# Patient Record
Sex: FEMALE | URBAN - METROPOLITAN AREA
[De-identification: names, ages, dates, MRNs, and addresses within clinical notes are randomized per-mention and may not be internally consistent; named-entity substitution may affect disease eponyms.]

---

## 2022-07-25 VITALS
HEIGHT: 63 IN | SYSTOLIC BLOOD PRESSURE: 104 MMHG | WEIGHT: 152.2 LBS | BODY MASS INDEX: 26.97 KG/M2 | DIASTOLIC BLOOD PRESSURE: 86 MMHG | HEART RATE: 94 BPM | OXYGEN SATURATION: 99 %

## 2022-07-25 PROBLEM — R87.810 ATYPICAL SQUAMOUS CELL CHANGES OF UNDETERMINED SIGNIFICANCE (ASCUS) ON CERVICAL CYTOLOGY WITH POSITIVE HIGH RISK HUMAN PAPILLOMA VIRUS (HPV): Status: ACTIVE | Noted: 2019-05-22

## 2022-07-25 PROBLEM — R74.8 ALKALINE PHOSPHATASE RAISED: Status: ACTIVE | Noted: 2022-07-25

## 2022-07-25 PROBLEM — R87.610 ATYPICAL SQUAMOUS CELL CHANGES OF UNDETERMINED SIGNIFICANCE (ASCUS) ON CERVICAL CYTOLOGY WITH POSITIVE HIGH RISK HUMAN PAPILLOMA VIRUS (HPV): Status: ACTIVE | Noted: 2019-05-22

## 2022-07-25 PROBLEM — J34.2 DEVIATED NASAL SEPTUM: Status: ACTIVE | Noted: 2022-07-25

## 2022-07-25 RX ORDER — CETIRIZINE HYDROCHLORIDE 10 MG/1
10 TABLET ORAL DAILY
COMMUNITY

## 2022-07-25 RX ORDER — VENLAFAXINE HYDROCHLORIDE 37.5 MG/1
37.5 CAPSULE, EXTENDED RELEASE ORAL DAILY
COMMUNITY
End: 2022-07-26

## 2022-07-25 RX ORDER — HYDROCORTISONE 25 MG/G
CREAM TOPICAL
COMMUNITY
End: 2022-07-26

## 2022-07-25 RX ORDER — LEVOTHYROXINE SODIUM 0.03 MG/1
25 TABLET ORAL DAILY
COMMUNITY

## 2022-07-25 NOTE — PROGRESS NOTES
Patient ID: Jay Murrieta is a 61 y.o. female    Chief Complaint: Hypothyroidism due to Hashimoto's disease   Referred by Mirna Martinez, [place]     HPI:   Jay Murrieta is here for initial evaluation of Hypothyroidism due to Hashimoto's disease. Levothyroxine started when TSH 5.63 in May 2022. US thyroid - May 2022  Right superior ill defined hypoechoic nodule 2.2 x 2.2 x 1.1 cms   Right posteriorly located hypoechoic nodule of 1 x 0.8 x 0.6 cms   Left side has three subcentimeter nodules   Two small lymph nodes seen, 8mm and 6mm     These nodules are stable from Jan 2021. She was sent to [address] Radiologist mentioned in his report \" The right lobe demonstrates an ill-defined area which is hypoechoic inhomogeneous echotexture, without well defined margins. .  Background thyroid gland demonstrates diffuse inhomogeneous echotexture. There is not a well defined nodule corresponding to this area for biopsy. There is a small second nodule measuring 8 mm which wasdiscussed at the time of the ProScan exam. \"     Takes Levothyroxine 25 mcg daily in morning empty stomach with water and waits for 60 minutes. Energy levels are slightly better   No change sin hair or skin changes   Weight is stable   She gets hot easily. Finger tips and feet get cold. She has Raynaud's. Sleep is poor due to hot flashes   Mood is stable   Denies excessive sweating, tremors, palpitations   No neck pain, denies compressive neck symptoms   Menopause at age 45     Family history of thyroid disorder: Dad had thyroidectomy for thyroid cancer. Sister taking levothyroxine. Also had biopsy which was benign   (not a relative) is a thyroid cancer survivor     No neck radiation  No Recent iodine loading in form of contrast material for diagnostic studies/cardiac cath  MVT daily, Vit D3 2000 units daily.  No other Food supplements, herbs or medications including Biotin  No recent URTI    Does not smoke       The following portions of the patient's history were reviewed and updated as appropriate:       Family History   Problem Relation Age of Onset    Other Mother     Stroke Father     Thyroid Disease Sister     Heart Disease Maternal Grandfather     Heart Disease Paternal Grandmother     Heart Failure Paternal Grandfather             Social History     Socioeconomic History    Marital status: Not on file     Spouse name: Not on file    Number of children: Not on file    Years of education: Not on file    Highest education level: Not on file   Occupational History    Not on file   Tobacco Use    Smoking status: Never    Smokeless tobacco: Never   Vaping Use    Vaping Use: Never used   Substance and Sexual Activity    Alcohol use: Yes     Comment: 2-4 times per month will have 1-2 drinks    Drug use: Never    Sexual activity: Not on file   Other Topics Concern    Not on file   Social History Narrative    Not on file     Social Determinants of Health     Financial Resource Strain: Not on file   Food Insecurity: Not on file   Transportation Needs: Not on file   Physical Activity: Not on file   Stress: Not on file   Social Connections: Not on file   Intimate Partner Violence: Not on file   Housing Stability: Not on file           Past Medical History:   Diagnosis Date    Alkaline phosphatase raised     Deviated nasal septum     Hashimoto's thyroiditis     Hypothyroidism     Nontoxic single thyroid nodule          History reviewed. No pertinent surgical history. No Known Allergies        Current Outpatient Medications:     levothyroxine (SYNTHROID) 25 MCG tablet, Take 25 mcg by mouth in the morning., Disp: , Rfl:     cetirizine (ZYRTEC) 10 MG tablet, Take 10 mg by mouth in the morning., Disp: , Rfl:       Review of Systems:  Constitutional: Negative for fever, chills. HENT: Negative for congestion, ear pain, rhinorrhea,  sore throat and trouble swallowing. Eyes: Negative for photophobia, redness, itching.    Respiratory: Negative for cough, shortness of breath and sputum. Cardiovascular: Negative for chest pain and leg swelling. Gastrointestinal: Negative for nausea, vomiting, abdominal pain, diarrhea, constipation. Endocrine: Negative for polydipsia, polyphagia and polyuria. Genitourinary: Negative for dysuria, urgency, frequency, hematuria and flank pain. Musculoskeletal: Negative for myalgias, back pain, arthralgias. Skin/Nail: Negative for rash, itching. Normal nails. Neurological: Negative for seizures, light-headedness, numbness and headaches. Hematological/ Lymph nodes: Negative for adenopathy. Does not bruise/bleed easily. Psychiatric/Behavioral: Negative for suicidal ideas and decreased concentration. Physical Exam:  /78 (Site: Left Upper Arm, Position: Sitting, Cuff Size: Medium Adult)   Pulse 72   Ht 5' 3\" (1.6 m)   Wt 152 lb 12.8 oz (69.3 kg)   SpO2 98%   BMI 27.07 kg/m²   Constitutional: Patient is oriented to person, place, and time. Patient appears well-developed and well-nourished. HENT:    Head: Normocephalic and atraumatic. Eyes: Conjunctivae and EOM are normal.     Neck: Normal range of motion. Thyroid normal.   Cardiovascular: Normal rate, regular rhythm and normal heart sounds. Pulmonary/Chest: Effort normal and breath sounds normal.   Musculoskeletal: Normal range of motion. Neurological: Patient is alert and oriented to person, place, and time. Patient has normal reflexes. No hand tremors. Skin: Skin is warm and dry. Psychiatric: Patient has a normal mood and affect. Patient behavior is normal.     Lab Review:    No results found for any previous visit. No results found. Assessment/Plan:     Adriana Vera was seen today for new patient. Diagnoses and all orders for this visit:    Acquired hypothyroidism  -     TSH with Reflex to FT4; Future  -     Comprehensive Metabolic Panel; Future  -     Vitamin D 25 Hydroxy; Future  -     PTH, Intact;  Future  -     Vitamin D 25 Hydroxy; Future  -     Vitamin D 1,25 Dihydroxy; Future  -     Electrophoresis Protein, Serum; Future  -     Protein Electrophoresis, Urine; Future  -     Magnesium; Future  -     Alkaline Phosphatase, Isoenzymes; Future    Multiple thyroid nodules  -     TSH with Reflex to FT4; Future  -     Comprehensive Metabolic Panel; Future  -     Vitamin D 25 Hydroxy; Future  -     PTH, Intact; Future  -     Vitamin D 25 Hydroxy; Future  -     Vitamin D 1,25 Dihydroxy; Future  -     Electrophoresis Protein, Serum; Future  -     Protein Electrophoresis, Urine; Future  -     Magnesium; Future  -     Alkaline Phosphatase, Isoenzymes; Future    Elevated alkaline phosphatase level  -     TSH with Reflex to FT4; Future  -     Comprehensive Metabolic Panel; Future  -     Vitamin D 25 Hydroxy; Future  -     PTH, Intact; Future  -     Vitamin D 25 Hydroxy; Future  -     Vitamin D 1,25 Dihydroxy; Future  -     Electrophoresis Protein, Serum; Future  -     Protein Electrophoresis, Urine; Future  -     Magnesium; Future  -     Alkaline Phosphatase, Isoenzymes; Future    Vitamin D deficiency  -     TSH with Reflex to FT4; Future  -     Comprehensive Metabolic Panel; Future  -     Vitamin D 25 Hydroxy; Future  -     PTH, Intact; Future  -     Vitamin D 25 Hydroxy; Future  -     Vitamin D 1,25 Dihydroxy; Future  -     Electrophoresis Protein, Serum; Future  -     Protein Electrophoresis, Urine; Future  -     Magnesium; Future  -     Alkaline Phosphatase, Isoenzymes; Future      1: Hypothyroidism     TFT normal June 2022     C/w Levothyroxine 25 mcg daily in am     Education: Reviewed how to properly take thyroid replacement. Instructed to take daily with water on an empty stomach without concomitant vitamins or calcium or other medicine. Wait for 30-45 minutes before eating. If patient is confident they missed a day of pills, they can take the missed dose with their next tablet (only for levothyroxine).     2: Multiple thyroid nodules Family history of thyroid cancer     Bedside US done   Gland is heterogeneous   Right superior 2.2 x 1.0 cms hypoechoic nodule seen     Fna indicated   Other option is active surveillance     She said she will think about it and will let us know   If she decides against Fna then repeat US in May 2023     2: elevated alkaline phosphatase   Range 122-130 - mildly high   Vit D 39.8 - April 2022      Check PTH, Mag, phos, SPEP, UPEP, Vit D, alk phos isoenzymes     RTC in 4 months with labs       Electronically signed by Gerardo Moody MD on 7/26/2022 at 2:42 PM

## 2022-07-26 ENCOUNTER — OFFICE VISIT (OUTPATIENT)
Dept: ENDOCRINOLOGY | Age: 57
End: 2022-07-26
Payer: COMMERCIAL

## 2022-07-26 VITALS
SYSTOLIC BLOOD PRESSURE: 122 MMHG | DIASTOLIC BLOOD PRESSURE: 78 MMHG | HEART RATE: 72 BPM | OXYGEN SATURATION: 98 % | HEIGHT: 63 IN | WEIGHT: 152.8 LBS | BODY MASS INDEX: 27.07 KG/M2

## 2022-07-26 DIAGNOSIS — E55.9 VITAMIN D DEFICIENCY: ICD-10-CM

## 2022-07-26 DIAGNOSIS — E03.9 ACQUIRED HYPOTHYROIDISM: Primary | ICD-10-CM

## 2022-07-26 DIAGNOSIS — R74.8 ELEVATED ALKALINE PHOSPHATASE LEVEL: ICD-10-CM

## 2022-07-26 DIAGNOSIS — E04.2 MULTIPLE THYROID NODULES: ICD-10-CM

## 2022-07-26 PROCEDURE — G8419 CALC BMI OUT NRM PARAM NOF/U: HCPCS | Performed by: INTERNAL MEDICINE

## 2022-07-26 PROCEDURE — G8427 DOCREV CUR MEDS BY ELIG CLIN: HCPCS | Performed by: INTERNAL MEDICINE

## 2022-07-26 PROCEDURE — 99204 OFFICE O/P NEW MOD 45 MIN: CPT | Performed by: INTERNAL MEDICINE

## 2023-11-02 ENCOUNTER — OFFICE VISIT (OUTPATIENT)
Dept: ENDOCRINOLOGY | Age: 58
End: 2023-11-02
Payer: COMMERCIAL

## 2023-11-02 VITALS
HEART RATE: 83 BPM | BODY MASS INDEX: 26.54 KG/M2 | SYSTOLIC BLOOD PRESSURE: 114 MMHG | DIASTOLIC BLOOD PRESSURE: 76 MMHG | OXYGEN SATURATION: 99 % | WEIGHT: 149.8 LBS | HEIGHT: 63 IN

## 2023-11-02 DIAGNOSIS — E04.2 MULTIPLE THYROID NODULES: ICD-10-CM

## 2023-11-02 DIAGNOSIS — E03.9 ACQUIRED HYPOTHYROIDISM: Primary | ICD-10-CM

## 2023-11-02 DIAGNOSIS — E27.8 RIGHT ADRENAL MASS (HCC): ICD-10-CM

## 2023-11-02 PROCEDURE — G8484 FLU IMMUNIZE NO ADMIN: HCPCS | Performed by: INTERNAL MEDICINE

## 2023-11-02 PROCEDURE — 1036F TOBACCO NON-USER: CPT | Performed by: INTERNAL MEDICINE

## 2023-11-02 PROCEDURE — 3017F COLORECTAL CA SCREEN DOC REV: CPT | Performed by: INTERNAL MEDICINE

## 2023-11-02 PROCEDURE — G8419 CALC BMI OUT NRM PARAM NOF/U: HCPCS | Performed by: INTERNAL MEDICINE

## 2023-11-02 PROCEDURE — 99214 OFFICE O/P EST MOD 30 MIN: CPT | Performed by: INTERNAL MEDICINE

## 2023-11-02 PROCEDURE — G8427 DOCREV CUR MEDS BY ELIG CLIN: HCPCS | Performed by: INTERNAL MEDICINE
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
## 2023-12-06 ENCOUNTER — TELEPHONE (OUTPATIENT)
Dept: ENDOCRINOLOGY | Age: 58
End: 2023-12-06

## 2023-12-06 NOTE — TELEPHONE ENCOUNTER
Call from Javier Noble from [facility] requesting US thyroid results from Dr. Henrietta Martínez    Fax# [phone]  Please advise

## 2024-05-29 ENCOUNTER — TELEPHONE (OUTPATIENT)
Dept: ENDOCRINOLOGY | Age: 59
End: 2024-05-29